# Patient Record
Sex: FEMALE | URBAN - METROPOLITAN AREA
[De-identification: names, ages, dates, MRNs, and addresses within clinical notes are randomized per-mention and may not be internally consistent; named-entity substitution may affect disease eponyms.]

---

## 2019-07-09 ENCOUNTER — OFFICE VISIT (OUTPATIENT)
Dept: URBAN - METROPOLITAN AREA CLINIC 32 | Facility: CLINIC | Age: 74
End: 2019-07-09
Payer: MEDICARE

## 2019-07-09 DIAGNOSIS — H52.4 PRESBYOPIA: ICD-10-CM

## 2019-07-09 DIAGNOSIS — H25.13 AGE-RELATED NUCLEAR CATARACT, BILATERAL: Primary | ICD-10-CM

## 2019-07-09 PROCEDURE — 92004 COMPRE OPH EXAM NEW PT 1/>: CPT | Performed by: OPHTHALMOLOGY

## 2019-07-09 ASSESSMENT — KERATOMETRY
OD: 44.13
OS: 44.25

## 2019-07-09 ASSESSMENT — INTRAOCULAR PRESSURE
OD: 22
OS: 22

## 2019-07-09 ASSESSMENT — VISUAL ACUITY
OS: 20/20
OD: 20/20

## 2019-07-09 NOTE — IMPRESSION/PLAN
Impression: Age-related nuclear cataract, bilateral: H25.13. Plan: Cataracts account for the patient's complaints. No treatment currently recommended. Ok to update Srx. Srx dispensed to patient. The patient will monitor vision changes and contact us with any decrease in vision.  1 year DE